# Patient Record
Sex: MALE | Race: WHITE | NOT HISPANIC OR LATINO | URBAN - METROPOLITAN AREA
[De-identification: names, ages, dates, MRNs, and addresses within clinical notes are randomized per-mention and may not be internally consistent; named-entity substitution may affect disease eponyms.]

---

## 2017-01-02 ENCOUNTER — EMERGENCY (EMERGENCY)
Facility: HOSPITAL | Age: 72
LOS: 1 days | Discharge: HOME | End: 2017-01-02
Attending: EMERGENCY MEDICINE | Admitting: EMERGENCY MEDICINE
Payer: MEDICAID

## 2017-01-02 VITALS
RESPIRATION RATE: 18 BRPM | SYSTOLIC BLOOD PRESSURE: 162 MMHG | TEMPERATURE: 98 F | OXYGEN SATURATION: 100 % | DIASTOLIC BLOOD PRESSURE: 84 MMHG | HEART RATE: 75 BPM

## 2017-01-02 VITALS
OXYGEN SATURATION: 97 % | TEMPERATURE: 99 F | HEART RATE: 69 BPM | DIASTOLIC BLOOD PRESSURE: 85 MMHG | RESPIRATION RATE: 19 BRPM | SYSTOLIC BLOOD PRESSURE: 179 MMHG

## 2017-01-02 DIAGNOSIS — R69 ILLNESS, UNSPECIFIED: ICD-10-CM

## 2017-01-02 DIAGNOSIS — F03.91 UNSPECIFIED DEMENTIA WITH BEHAVIORAL DISTURBANCE: ICD-10-CM

## 2017-01-02 LAB
ALBUMIN SERPL ELPH-MCNC: 3.6 G/DL — SIGNIFICANT CHANGE UP (ref 3.3–5)
ALP SERPL-CCNC: 55 U/L — SIGNIFICANT CHANGE UP (ref 40–120)
ALT FLD-CCNC: 76 U/L — HIGH (ref 4–41)
APAP SERPL-MCNC: < 15 UG/ML — LOW (ref 15–25)
AST SERPL-CCNC: 111 U/L — HIGH (ref 4–40)
BARBITURATES MEASUREMENT: NEGATIVE — SIGNIFICANT CHANGE UP
BASOPHILS # BLD AUTO: 0.03 K/UL — SIGNIFICANT CHANGE UP (ref 0–0.2)
BASOPHILS NFR BLD AUTO: 1 % — SIGNIFICANT CHANGE UP (ref 0–2)
BENZODIAZ SERPL-MCNC: NEGATIVE — SIGNIFICANT CHANGE UP
BILIRUB SERPL-MCNC: 0.3 MG/DL — SIGNIFICANT CHANGE UP (ref 0.2–1.2)
BUN SERPL-MCNC: 11 MG/DL — SIGNIFICANT CHANGE UP (ref 7–23)
CALCIUM SERPL-MCNC: 9.1 MG/DL — SIGNIFICANT CHANGE UP (ref 8.4–10.5)
CHLORIDE SERPL-SCNC: 104 MMOL/L — SIGNIFICANT CHANGE UP (ref 98–107)
CO2 SERPL-SCNC: 23 MMOL/L — SIGNIFICANT CHANGE UP (ref 22–31)
CREAT SERPL-MCNC: 0.82 MG/DL — SIGNIFICANT CHANGE UP (ref 0.5–1.3)
EOSINOPHIL # BLD AUTO: 0.2 K/UL — SIGNIFICANT CHANGE UP (ref 0–0.5)
EOSINOPHIL NFR BLD AUTO: 6.6 % — HIGH (ref 0–6)
ETHANOL BLD-MCNC: < 10 MG/DL — SIGNIFICANT CHANGE UP
GLUCOSE SERPL-MCNC: 89 MG/DL — SIGNIFICANT CHANGE UP (ref 70–99)
HCT VFR BLD CALC: 36 % — LOW (ref 39–50)
HGB BLD-MCNC: 11.5 G/DL — LOW (ref 13–17)
IMM GRANULOCYTES NFR BLD AUTO: 0 % — SIGNIFICANT CHANGE UP (ref 0–1.5)
LYMPHOCYTES # BLD AUTO: 1.01 K/UL — SIGNIFICANT CHANGE UP (ref 1–3.3)
LYMPHOCYTES # BLD AUTO: 33.6 % — SIGNIFICANT CHANGE UP (ref 13–44)
MCHC RBC-ENTMCNC: 26.8 PG — LOW (ref 27–34)
MCHC RBC-ENTMCNC: 31.9 % — LOW (ref 32–36)
MCV RBC AUTO: 83.9 FL — SIGNIFICANT CHANGE UP (ref 80–100)
MONOCYTES # BLD AUTO: 0.16 K/UL — SIGNIFICANT CHANGE UP (ref 0–0.9)
MONOCYTES NFR BLD AUTO: 5.3 % — SIGNIFICANT CHANGE UP (ref 2–14)
NEUTROPHILS # BLD AUTO: 1.61 K/UL — LOW (ref 1.8–7.4)
NEUTROPHILS NFR BLD AUTO: 53.5 % — SIGNIFICANT CHANGE UP (ref 43–77)
PLATELET # BLD AUTO: 171 K/UL — SIGNIFICANT CHANGE UP (ref 150–400)
PMV BLD: 10 FL — SIGNIFICANT CHANGE UP (ref 7–13)
POTASSIUM SERPL-MCNC: 3.8 MMOL/L — SIGNIFICANT CHANGE UP (ref 3.5–5.3)
POTASSIUM SERPL-SCNC: 3.8 MMOL/L — SIGNIFICANT CHANGE UP (ref 3.5–5.3)
PROT SERPL-MCNC: 8.5 G/DL — HIGH (ref 6–8.3)
RBC # BLD: 4.29 M/UL — SIGNIFICANT CHANGE UP (ref 4.2–5.8)
RBC # FLD: 14.5 % — SIGNIFICANT CHANGE UP (ref 10.3–14.5)
SALICYLATES SERPL-MCNC: < 5 MG/DL — LOW (ref 15–30)
SODIUM SERPL-SCNC: 139 MMOL/L — SIGNIFICANT CHANGE UP (ref 135–145)
TSH SERPL-MCNC: 2.48 UIU/ML — SIGNIFICANT CHANGE UP (ref 0.27–4.2)
WBC # BLD: 3.01 K/UL — LOW (ref 3.8–10.5)
WBC # FLD AUTO: 3.01 K/UL — LOW (ref 3.8–10.5)

## 2017-01-02 PROCEDURE — 99284 EMERGENCY DEPT VISIT MOD MDM: CPT | Mod: 25

## 2017-01-02 PROCEDURE — 90792 PSYCH DIAG EVAL W/MED SRVCS: CPT

## 2017-01-02 PROCEDURE — 99053 MED SERV 10PM-8AM 24 HR FAC: CPT

## 2017-01-02 RX ORDER — DIPHENHYDRAMINE HCL 50 MG
25 CAPSULE ORAL ONCE
Refills: 0 | Status: COMPLETED | OUTPATIENT
Start: 2017-01-02 | End: 2017-01-02

## 2017-01-02 RX ORDER — HALOPERIDOL DECANOATE 100 MG/ML
5 INJECTION INTRAMUSCULAR ONCE
Refills: 0 | Status: COMPLETED | OUTPATIENT
Start: 2017-01-02 | End: 2017-01-02

## 2017-01-02 RX ADMIN — Medication 25 MILLIGRAM(S): at 08:30

## 2017-01-02 RX ADMIN — Medication 1 MILLIGRAM(S): at 08:30

## 2017-01-02 RX ADMIN — HALOPERIDOL DECANOATE 5 MILLIGRAM(S): 100 INJECTION INTRAMUSCULAR at 08:30

## 2017-01-02 NOTE — ED ADULT NURSE NOTE - OBJECTIVE STATEMENT
Pt brought to  room 6 from main ED. Pt from [facility] sent for aggressive behavior and throwing objects. As per transfer papers, pt threw top of toilet through glass window. In ED, pt requesting to be called "11th hour". Pt has playing cards in pocket and ripping them up in pieces and throwing them on floor. Belongings safely removed and secured. Medicated as per md orders. Pt laying in stretcher and appears in nad. Will continue to monitor.

## 2017-01-02 NOTE — ED ADULT NURSE NOTE - CHIEF COMPLAINT QUOTE
Pt brought in by EMS from [facility] for aggressive behavior.  As per EMS, pt was being aggressive and violent toward staff members, throwing objects. A&Ox2 (person and place).  Pt denies any physical complaints.  Requesting to go to [facility].  Pt not cooperative for VS, refusing at this time.  Pt has hx of anxiety and schizophrenia.  PMHx:  BPH, GERD, hypothyroid, goiter, hyponatremia

## 2017-01-02 NOTE — ED BEHAVIORAL HEALTH ASSESSMENT NOTE - REASON FOR REFERRAL
[facility] from nursing home after patient was throwing laundry out a hole in his window screen acting out/dementia/agitation

## 2017-01-02 NOTE — ED BEHAVIORAL HEALTH ASSESSMENT NOTE - MEDICATIONS (PRESCRIPTIONS, DIRECTIONS)
recommending increasing haldol decanoate to 150mg IM q4 weekly; may have to bridge with PRNs for ~ 1 week before increased concentration is achieved. Recommending haldol 5mg PO/IM q6hrs PRN for severe agitation as long as QTc is < 500 msec. Even though Patient is age > 65yrs, he is highly reactive and can be aggressive thus use of antipsychotics is reasonable and benefits > risks. For extrapyramidal side effects (stiffening of muscles, dystonia etc) can use diphenhydramine 25mg PO/IM PRN 1-2

## 2017-01-02 NOTE — ED PROVIDER NOTE - OBJECTIVE STATEMENT
70 y/o M with h/o schizophrenia, anxiety sent from [facility] for aggressive behavior. As per transfer note patient was aggressive with staff and threw toilet seat through glass window. Patient aggressive in ED and refusing to provide history or perform physical exam.

## 2017-01-02 NOTE — ED BEHAVIORAL HEALTH ASSESSMENT NOTE - REFERRAL / APPOINTMENT DETAILS
recommending consulting psychiatrist visits to be at least weekly for this patient; consider supervision when he is in common areas and in reach of things he can throw

## 2017-01-02 NOTE — ED BEHAVIORAL HEALTH ASSESSMENT NOTE - SUMMARY
72 yo male with unclear psychiatric history, if any prior to nursing home placement, who returns to ED again 4 days for the same issue of acting out behaviors at his nursing home. Upon careful evaluation of Patient's clinical presentation, symptoms/severity/variation, it is with high degree of clinical certainty that Patient has primary diagnosis of dementia, advancing stage, with behavioral disturbances. Patient exhibits severe, multi-area cognitive deficits of short-term memory loss, long-term memory loss, episodic memory loss, personality changes, disorientation, diurnal variation, disorganized behavior consistent with  advanced-stage degenerative process. These behaviors will unavoidably worsen as the neurodegenerative disease progresses. There is no formal treatment for dementia only symptom management. Patient at this time is not a candidate for inpatient psychiatric treatment as it would not alter the prognosis / symptoms of his illness; Patient cannot benefit from group therapy and /or individual therapy at this time due to inability to engage meaningfully with his environment. Moreover, Patient is under psychiatric treatment at this time and is seen by a consulting psychiatrist at the nursing home and Patient is medication compliant as he is on haldol decanoate. Inpatient level of care would not accomplish any treatment which cannot be done as an outpatient such as medication dose adjustment.   Patient is appropriate placement in a nursing home that has a locked dementia unit. 72 yo male with unclear psychiatric history, if any prior to nursing home placement, who returns to ED again 4 days for the same issue of acting out behaviors at his nursing home. Upon careful evaluation of Patient's clinical presentation, symptoms/severity/variation, it is with high degree of clinical certainty that Patient has primary diagnosis of dementia, advancing stage, with behavioral disturbances. Patient exhibits severe, multi-area cognitive deficits of short-term memory loss, long-term memory loss, episodic memory loss, personality changes, disorientation, diurnal variation, disorganized behavior consistent with  advanced-stage degenerative process. These behaviors will unavoidably worsen as the neurodegenerative disease progresses. There is no formal treatment for dementia only symptom management. Patient at this time is not a candidate for inpatient psychiatric treatment as it would not alter the prognosis / symptoms of his illness; Patient cannot benefit from group therapy and /or individual therapy at this time due to inability to engage meaningfully with his environment. Moreover, Patient is under psychiatric treatment at this time and is seen by a consulting psychiatrist at the nursing home and Patient is medication compliant as he is on haldol decanoate. Inpatient level of care would not accomplish any treatment which cannot be done as an outpatient such as medication dose adjustment / adding on agents like depakote for impulsivity. Patient is appropriate placement in a nursing home that has a locked dementia unit.

## 2017-01-02 NOTE — ED PROVIDER NOTE - CHPI ED SYMPTOMS NEG
no chest pain, no SOB, no abdominal pain, no nausea. no vomiting, no diarrhea, no dysuria, no increased frequency/no pain/no chills

## 2017-01-02 NOTE — ED BEHAVIORAL HEALTH ASSESSMENT NOTE - SAFETY PLAN DETAILS
Advised to return to ED or call 911 for any suicidal ideation, homicidal thoughts or worsening symptoms. Patient cites understanding of instructions

## 2017-01-02 NOTE — ED BEHAVIORAL HEALTH ASSESSMENT NOTE - OTHER PAST PSYCHIATRIC HISTORY (INCLUDE DETAILS REGARDING ONSET, COURSE OF ILLNESS, INPATIENT/OUTPATIENT TREATMENT)
Diagnosed with "Schizophrenia" and "Psychotic Disorder NOS" as per nursing home though no previous psychiatric history available. Presentation highly consistent with dementia with behavioral disturbances.

## 2017-01-02 NOTE — ED BEHAVIORAL HEALTH ASSESSMENT NOTE - OTHER
Internist nursing home peers CVM supportive residence unable to test at this time multi-domain cognitive deficits consistent with dementia chronically limited use diphenhydramine only when EPS is present / indicated as it is highly anticholinergic which has been shown to increase agitation/confusion in dementia patients. superficially cooperative; not aggressive; redirectable limited intermittent chronically impaired as per history; under adequate behavioral control in BH general linearity with paucity of thought at times loud

## 2017-01-02 NOTE — ED BEHAVIORAL HEALTH ASSESSMENT NOTE - RISK ASSESSMENT
Protective factors include medication compliance, use of an SPEAR, supportive housing, no access to guns, no global insomnia, no substance abuse, no active suicidal ideation, no homicidal ideation. Risk factors, which are chronic, include chronic aggression, chronic agitation, Schizophrenia diagnosis, older age, Dementia, medical issues. While patient has risk factors, he has no acute risk factors. Patient denies SI, HI. Nursing home is stating that pt's presentation today is his baseline. Patient would not benefit from an inpatient psychiatric hospitalization and would be best served by f/u with nursing home psychiatrist as scheduled.

## 2017-01-02 NOTE — ED BEHAVIORAL HEALTH ASSESSMENT NOTE - DETAILS
dementia; agitation chronically aggressive/see HPI referrant aware of discharge discussed with EM Attending of record recommending UA as patient is a geriatric nursing home patient

## 2017-01-02 NOTE — ED PROVIDER NOTE - ATTENDING CONTRIBUTION TO CARE
Attending note:   After face to face evaluation of this patient, I concur with above noted hx, pe, and care plan for this patient. +Schizophrenic from NH with aggressive behavior this am with toilet seat thrown.   Unable to get within patient's personal space because of aggressive behavior.     Patient transferred to  for staff safety.  Will evaluate after sedation, labs.

## 2017-01-02 NOTE — ED ADULT TRIAGE NOTE - CHIEF COMPLAINT QUOTE
Pt brought in by EMS from [facility] for aggressive behavior.  As per EMS, pt was being aggressive and violent toward staff members, throwing objects. A&Ox2 (person and place).  Pt denies any physical complaints.  Requesting to [facility].  Pt not cooperative for VS, refusing at this time.  Pt has hx of anxiety and schizophrenia.  PMHx:  BPH, GERD, hypothyroid, goiter, hyponatremia

## 2017-01-02 NOTE — ED BEHAVIORAL HEALTH ASSESSMENT NOTE - DESCRIPTION
agitation, which seems to be a baseline state of being easily aroused/reactive, but redirectable. GERD, anemia, constipation, hypothyroidism resident of a nursing home agitated in triage so placed into . Patient's unwelcoming and grizzled behavior seems to be a baseline state of being easily reactive. he is redirectable and was not aggressive to peers/staff in .

## 2017-01-02 NOTE — ED BEHAVIORAL HEALTH ASSESSMENT NOTE - ORIENTATION OTHER
thinks it is "1920's something;" does not know why he is in the hospital; does not know season or able to name 7 days of the week

## 2017-01-02 NOTE — ED BEHAVIORAL HEALTH ASSESSMENT NOTE - HPI (INCLUDE ILLNESS QUALITY, SEVERITY, DURATION, TIMING, CONTEXT, MODIFYING FACTORS, ASSOCIATED SIGNS AND SYMPTOMS)
72 y/o AAM, domiciled at [facility], no dependents, disabled, history of Schizophrenia, Dementia, unknown psychiatric hospitalization history, no known hx of suicide attempts, chronic daily aggressive behaviors, no legal problems, no current drug use/ETOH, no known hx of DTs, PMH of GERD, anemia, constipation, hypothyroidism, BIB EMS from nursing home after he threw an object through a window breaking it.     Patient is a poor historian and is unable to provide meaningful past and current medical /psychiatric history. Patient has obvious difficulty maintaining attention/concentration and engaging meaningfully in conversation with multi area cognitive deficits consistent with a neurodegenerative process such as a dementia. Nursing home chart reports "psychotic disorder" and 'schizophrenia", however, signs/symptoms for these illnesses cannot be ascertained in this Patient at this time due to his overlying cognitive deficits. Irrespectively, even if he has schizophrenia in the past, that is certainly not the issue at this point in time and would not alter the treatment recommendations.     COLLATERAL FROM NURSING HOME: Patient has been a resident at the nursing home since 11/6/2015 and he has had chronic acting out/aggressive behaviors sicne admission (ie. throwing waste like throws feces regularly, spitting, rude/unfriendly). . She states he interacts minimally with others and when engaged is minimally responsive. She states he has a history of Schizophrenia and is prescribed Haldol Decanoate injections by Dr. Keyes. She does not know pt's psychiatric history and states the psychiatrist is not in the office today nor does she has her contact information. She states today patient was sent to ED because staff went in his room and he had ripped a hole in his window screen and was observed throwing items out of the window and pulling on the windowsill as if he wanted to climb up onto the sill. However, she states he did not actually go on the windowsill and states he did not state that he was trying to do this. She denies him threatening to harm himself or others, and states he never provides answers for why he engages in certain behaviors. She states she was not surprised by his behavior today as he always engages in property destruction. She had no safety concerns for patient but states they sent him as per protocol.    In ED, patient is cooperative with staff and is not aggressive, self-harming or threatening. He states he does not know why he is here and states he didn't do anything aggressive or dangerous. He denies SI, intent or plan and denies HI. He is minimally engageable but denies mood symptoms or psychotic sx. He states he believes the years is 1920, does not know the month and believes he lives in a "shelter". He is disoriented to season, time but is able to state his name. He reports feeling safe at his home and wants to return. Patient is a single, noncaregiver, 70 y/o AAM, domiciled at [facility] for > 1.2 years, no dependents, disabled, with charted history of "Schizophrenia," and "psychotic disorder," last ED visits diagnosis of Dementia, with unknown/unsubstantiated psychiatric history, no reported hx of suicide attempts, chronic daily aggressive behaviors which have been ongoing since admission to [facility], no legal problems, no current drug use/ETOH, BIB EMS from nursing home for throwing an object through a window and breaking it, which is a similar presentation as 4 days ago when he was BIB EMS.     Patient is a poor historian and is unable to provide meaningful past and current medical /psychiatric history. Unable to complete an MMSE including clock drawing. Irritable, unfriendly. While, minimally engageable, he does deny any suicidal/homicidal ideation and denies feeling sad/depressed. Does not exhibit or endorse manic sxs. He thinks it is "1920 something." he lives in a shelter and is still working. He wants to "go home."    Patient has obvious difficulty maintaining attention/concentration and engaging meaningfully in conversation with multi area cognitive deficits consistent with a neurodegenerative process such as a dementia. Nursing home chart reports "psychotic disorder" and 'schizophrenia", however, signs/symptoms for these illnesses cannot be ascertained in this Patient at this time due to his overlying cognitive deficits. Irrespectively, even if he has schizophrenia in the past, that is certainly not the issue at this point in time and would not alter the treatment recommendations.     COLLATERAL FROM NURSING HOME: Patient has been a resident at the nursing home since 11/6/2015 and he has had chronic acting out/aggressive behaviors since admission (ie. throwing waste like throws feces regularly, spitting, rude/unfriendly). He does not socialize with peers/staff and is at baseline limited in his interaction with people. He is prescribed Haldol Decanoate IM monthly by a Dr. Keyes. Otherwise Patient has been his usual self with usual behaviors.

## 2017-01-02 NOTE — ED PROVIDER NOTE - MEDICAL DECISION MAKING DETAILS
70 y/o M with h/o schizophrenia sent from NH for aggressive behavior. Patient aggressive towards staff in ED and refusing physical exam, vitals and lab work. Patient denies any complaints and is not in any distress. Will be transferred to .

## 2017-01-02 NOTE — ED PROVIDER NOTE - CARE PLAN
Principal Discharge DX:	Agitation Principal Discharge DX:	Dementia with behavioral disturbance, unspecified dementia type

## 2017-01-02 NOTE — ED PROVIDER NOTE - PMH
Abnormal cardiovascular function study    Anxiety    BPH (benign prostatic hyperplasia)    GERD (gastroesophageal reflux disease)    Goiter    Hyponatremia    Hypothyroid    Schizophrenia

## 2018-06-25 ENCOUNTER — EMERGENCY (EMERGENCY)
Facility: HOSPITAL | Age: 73
LOS: 1 days | Discharge: HOME | End: 2018-06-25
Attending: EMERGENCY MEDICINE | Admitting: EMERGENCY MEDICINE
Payer: MEDICARE

## 2018-06-25 VITALS
HEART RATE: 60 BPM | RESPIRATION RATE: 18 BRPM | SYSTOLIC BLOOD PRESSURE: 155 MMHG | DIASTOLIC BLOOD PRESSURE: 72 MMHG | TEMPERATURE: 98 F | OXYGEN SATURATION: 99 %

## 2018-06-25 PROCEDURE — 99282 EMERGENCY DEPT VISIT SF MDM: CPT | Mod: 25

## 2018-06-25 NOTE — ED PROVIDER NOTE - MEDICAL DECISION MAKING DETAILS
73 Y M with psych hx and prior episode of eating  feces out of toilet sent for same with no other complaints and normal vitals. Will send patient back to [facility] with recs to return if he develops symptoms of illness.
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
## 2018-06-25 NOTE — PROVIDER CONTACT NOTE (OTHER) - ASSESSMENT
Arranged [facility] [phone]. Pt returning to [facility]. P/U 1 AM. [facility] Auth#  [id number]

## 2018-06-25 NOTE — ED PROVIDER NOTE - ATTENDING CONTRIBUTION TO CARE
73ym hx of schizophrenia sent from [facility] for eating his own feces out of the bowel, not the first time. Pt is not able to participate in conversation. As per [facility] staff this is nothing new for him. No other compliants. Vss. Pe as noted by resident, no signs of infection unremarkable pe. Pt dc back to [facility] in stable conditon

## 2018-06-25 NOTE — ED PROVIDER NOTE - OBJECTIVE STATEMENT
73 Y M with hx of schizophrenia who presents from [facility] after eating his own feces out of the toilet. Pt is not answer questions on exam and saying statements that do not make sense. Review of chart shows this is consistent with his normal behaviour. [facility] said he has hx of eating feces from Toilet before. She says he has not been acting any different than normal lately. She says that they sent him here for observation because he ate his feces to make sure that doesn't make him sick. ROS limited 2/2 patients status.

## 2018-06-26 VITALS
SYSTOLIC BLOOD PRESSURE: 143 MMHG | RESPIRATION RATE: 17 BRPM | OXYGEN SATURATION: 98 % | HEART RATE: 81 BPM | DIASTOLIC BLOOD PRESSURE: 46 MMHG

## 2018-06-28 ENCOUNTER — EMERGENCY (EMERGENCY)
Facility: HOSPITAL | Age: 73
LOS: 1 days | Discharge: HOME | End: 2018-06-28
Attending: EMERGENCY MEDICINE | Admitting: EMERGENCY MEDICINE
Payer: MEDICAID

## 2018-06-28 VITALS
RESPIRATION RATE: 18 BRPM | DIASTOLIC BLOOD PRESSURE: 78 MMHG | OXYGEN SATURATION: 100 % | HEART RATE: 60 BPM | SYSTOLIC BLOOD PRESSURE: 138 MMHG | TEMPERATURE: 98 F

## 2018-06-28 VITALS
RESPIRATION RATE: 16 BRPM | HEART RATE: 58 BPM | SYSTOLIC BLOOD PRESSURE: 144 MMHG | OXYGEN SATURATION: 100 % | TEMPERATURE: 98 F | DIASTOLIC BLOOD PRESSURE: 76 MMHG

## 2018-06-28 PROCEDURE — 99283 EMERGENCY DEPT VISIT LOW MDM: CPT | Mod: 25

## 2018-06-28 PROCEDURE — 90792 PSYCH DIAG EVAL W/MED SRVCS: CPT

## 2018-06-28 NOTE — ED BEHAVIORAL HEALTH ASSESSMENT NOTE - ORIENTATION OTHER
thinks it is "1920's something;" does not know why he is in the hospital; does not know season or able to name 7 days of the week see HPI
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
## 2018-06-28 NOTE — ED PROVIDER NOTE - PSYCHIATRIC, MLM
Patient following commands, but not answering my questions.  * per psych MD note:  ""He thinks it is "1920 something." he lives in a shelter and is still working. He wants to "go home.""

## 2018-06-28 NOTE — ED PROVIDER NOTE - OBJECTIVE STATEMENT
Onset:  1 hrs PTA.  Context:  patient has been to [facility] ED for eating feces before, most recently 3d ago.  He has also exhibited this chief complaint along with aggressive sexual behavior in the past.  He is overall cooperative in the ED but not answering my questions in the ED.  Review of chart shows this is consistent with his normal behaviour.  Associated Sx:  no fever.  No reported trauma

## 2018-06-28 NOTE — ED BEHAVIORAL HEALTH ASSESSMENT NOTE - SAFETY PLAN DETAILS
Advised to return to ED or call 911 for any suicidal ideation, homicidal thoughts or worsening symptoms. Patient cites understanding of instructions Advised to return to ED or call 911 for any suicidal ideation, homicidal thoughts or worsening symptoms.

## 2018-06-28 NOTE — ED PROVIDER NOTE - MEDICAL DECISION MAKING DETAILS
74 yo M, exhibiting Sx of dementia with behavior disturbance.  No SI/HI.  Hemodynamically wnl.  No acute medical condition at this time.  Cleared by psychiatry.  Plan:  d/c back to [facility].

## 2018-06-28 NOTE — ED BEHAVIORAL HEALTH ASSESSMENT NOTE - MEDICATIONS (PRESCRIPTIONS, DIRECTIONS)
recommending increasing haldol decanoate to 150mg IM q4 weekly; may have to bridge with PRNs for ~ 1 week before increased concentration is achieved. Recommending haldol 5mg PO/IM q6hrs PRN for severe agitation as long as QTc is < 500 msec. Even though Patient is age > 65yrs, he is highly reactive and can be aggressive thus use of antipsychotics is reasonable and benefits > risks. For extrapyramidal side effects (stiffening of muscles, dystonia etc) can use diphenhydramine 25mg PO/IM PRN 1-2 continue medications as prescribed
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
## 2018-06-28 NOTE — ED BEHAVIORAL HEALTH ASSESSMENT NOTE - OTHER
chronically impaired as per history; under adequate behavioral control in BH general linearity with paucity of thought CVM use diphenhydramine only when EPS is present / indicated as it is highly anticholinergic which has been shown to increase agitation/confusion in dementia patients. unable to test at this time nursing home supportive residence intermittent chronically limited multi-domain cognitive deficits consistent with dementia peers Norma (staff at [facility]) at times loud superficially cooperative; not aggressive; redirectable limited superficially cooperative impaired due to pt not wearing his dentures chronically impaired as per history; in good behavioral control in  ED

## 2018-06-28 NOTE — CHART NOTE - NSCHARTNOTEFT_GEN_A_CORE
Er note Er note  [facility] since  his readmission He had an episode of agitation by harm to himself and other resident staff when they tried to redirect him  He is a 73-year-old male with history of schizophrenia seen in the ER quiet and calm at present time.  His medical case was discussed with Dr. Dietrich in detail about his admission.       he had  eating his own feces out of the toilet. Pt is not answer questions on exam and saying statements that do not make sense.   After seeing the patient and discussed with the nu is Bradleyabjeanne conversation with Dr. Dietrich   he was leaving   The ER by 8:00 and he will relay the next shift
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
## 2018-06-28 NOTE — ED BEHAVIORAL HEALTH ASSESSMENT NOTE - RISK ASSESSMENT
Protective factors include medication compliance, use of an SPEAR, supportive housing, no access to guns, no global insomnia, no substance abuse, no active suicidal ideation, no homicidal ideation. Risk factors, which are chronic, include chronic aggression, chronic agitation, Schizophrenia diagnosis, older age, Dementia, medical issues. While patient has risk factors, he has no acute risk factors. Patient denies SI, HI. Nursing home is stating that pt's presentation today is his baseline. Patient would not benefit from an inpatient psychiatric hospitalization and would be best served by f/u with nursing home psychiatrist as scheduled. Protective factors include supportive housing, no access to guns, no global insomnia, no substance abuse, no active suicidal ideation, no homicidal ideation. Risk factors, which are chronic, include chronic aggression, chronic agitation, Schizophrenia diagnosis, older age, Dementia, medical issues. While patient has risk factors, he has no acute risk factors. Patient denies SI, HI. [facility] is stating that pt's presentation today is his baseline. Patient would not benefit from an inpatient psychiatric hospitalization and would be best served by f/u with nursing home psychiatrist as scheduled.

## 2018-06-28 NOTE — ED BEHAVIORAL HEALTH ASSESSMENT NOTE - DESCRIPTION
resident of a nursing home agitated in triage so placed into . Patient's unwelcoming and grizzled behavior seems to be a baseline state of being easily reactive. he is redirectable and was not aggressive to peers/staff in . GERD, anemia, constipation, hypothyroidism calm, cooperative, in good behavioral control    Vital Signs Last 24 Hrs  T(C): 36.7 (28 Jun 2018 10:26), Max: 36.9 (28 Jun 2018 07:08)  T(F): 98 (28 Jun 2018 10:26), Max: 98.4 (28 Jun 2018 07:08)  HR: 60 (28 Jun 2018 10:26) (58 - 60)  BP: 138/78 (28 Jun 2018 10:26) (138/78 - 144/76)  BP(mean): --  RR: 18 (28 Jun 2018 10:26) (16 - 18)  SpO2: 100% (28 Jun 2018 10:26) (100% - 100%)

## 2019-04-09 NOTE — ED BEHAVIORAL HEALTH ASSESSMENT NOTE - DETAILS
Order faxed chronically aggressive/see HPI discussed with EM Attending of record recommending UA as patient is a geriatric nursing home patient referrant aware of discharge dementia informed nursing home

## 2021-07-22 NOTE — ED BEHAVIORAL HEALTH ASSESSMENT NOTE - CURRENT MEDICATION
no ROM deficits were identified Famotidine, Haldol Decanoate 100mg q month, Ativan 0.5mg bid, senna, synthroid, depakote 250mg tid

## 2021-10-20 NOTE — ED BEHAVIORAL HEALTH ASSESSMENT NOTE - NS ED BHA MED ROS ENT MOUTH
No complaints Calcipotriene Pregnancy And Lactation Text: This medication has not been proven safe during pregnancy. It is unknown if this medication is excreted in breast milk.

## 2022-02-09 NOTE — ED BEHAVIORAL HEALTH ASSESSMENT NOTE - NS ED [facility] ED COURSE UTILIZATION OF 1 TO 1 IN ED YN
Small cyst to the right kidney but otherwise ultrasound is normal.  Patient is due to be seen today, will discuss at visit
No
